# Patient Record
Sex: MALE | Race: WHITE | RURAL
[De-identification: names, ages, dates, MRNs, and addresses within clinical notes are randomized per-mention and may not be internally consistent; named-entity substitution may affect disease eponyms.]

---

## 2019-06-07 ENCOUNTER — OFFICE VISIT (OUTPATIENT)
Dept: FAMILY MEDICINE CLINIC | Age: 24
End: 2019-06-07

## 2019-06-07 VITALS
WEIGHT: 165.4 LBS | TEMPERATURE: 99.8 F | OXYGEN SATURATION: 99 % | RESPIRATION RATE: 18 BRPM | DIASTOLIC BLOOD PRESSURE: 78 MMHG | HEIGHT: 72 IN | SYSTOLIC BLOOD PRESSURE: 123 MMHG | HEART RATE: 100 BPM | BODY MASS INDEX: 22.4 KG/M2

## 2019-06-07 DIAGNOSIS — J02.9 SORE THROAT: ICD-10-CM

## 2019-06-07 DIAGNOSIS — J02.0 STREP THROAT: Primary | ICD-10-CM

## 2019-06-07 LAB
S PYO AG THROAT QL: POSITIVE
VALID INTERNAL CONTROL?: YES

## 2019-06-07 RX ORDER — AMOXICILLIN 500 MG/1
500 CAPSULE ORAL 2 TIMES DAILY
Qty: 20 CAP | Refills: 0 | Status: SHIPPED | OUTPATIENT
Start: 2019-06-07 | End: 2019-06-17

## 2019-06-07 RX ORDER — CLINDAMYCIN AND BENZOYL PEROXIDE 10; 50 MG/G; MG/G
GEL TOPICAL
Refills: 1 | COMMUNITY
Start: 2019-04-22

## 2019-06-07 NOTE — LETTER
NOTIFICATION RETURN TO WORK / SCHOOL 
 
6/7/2019 8:51 AM 
 
Mr. Amanda Arambula [address] [place] [id number] To Whom It May Concern: 
 
Amanda Arambula is currently under the care of [facility]. He needs to be excused from work on 6/7, due to illness If there are questions or concerns please have the patient contact our office. Sincerely, Amisha Schmidt NP

## 2019-06-07 NOTE — PROGRESS NOTES
Melida Guerrero is a 26 y.o. male    Chief Complaint   Patient presents with    New Patient    Establish Care    Sore Throat     x 2 days       1. Have you been to the ER, urgent care clinic since your last visit? Hospitalized since your last visit? No  M  2. Have you seen or consulted any other health care providers outside of the [address] [place] [address] since your last visit? Include any pap smears or colon screening. No      Visit Vitals  /78 (BP 1 Location: Left arm, BP Patient Position: Sitting)   Pulse 100   Temp 99.8 °F (37.7 °C) (Oral)   Resp 18   Ht 6' (1.829 m)   Wt 165 lb 6.4 oz (75 kg)   SpO2 99%   BMI 22.43 kg/m²           Health Maintenance Due   Topic Date Due    DTaP/Tdap/Td series (1 - Tdap) 07/25/2017         Medication Reconciliation completed, changes noted.   Please  Update medication list.

## 2019-06-07 NOTE — PATIENT INSTRUCTIONS

## 2019-06-07 NOTE — PROGRESS NOTES
HISTORY OF PRESENT ILLNESS  Vianca Renteria is a 26 y.o. male. HPI  Pt presents with \"sore throat\"    Symptoms have been present for about 3 days  Sore throat  Swollen throat    OTC: Ibuprofen/Tylenol  Review of Systems   Constitutional: Negative for fever. HENT: Positive for sore throat. Gastrointestinal: Negative for diarrhea and vomiting. Physical Exam   Constitutional: He is oriented to person, place, and time. He appears well-developed and well-nourished. HENT:   Head: Normocephalic and atraumatic. Right Ear: Hearing, tympanic membrane, external ear and ear canal normal.   Left Ear: Hearing, tympanic membrane, external ear and ear canal normal.   Nose: Mucosal edema and rhinorrhea present. Mouth/Throat: Posterior oropharyngeal edema and posterior oropharyngeal erythema present. Neck: Normal range of motion. Neck supple. Cardiovascular: Normal rate, regular rhythm and normal heart sounds. Pulmonary/Chest: Effort normal and breath sounds normal.   Lymphadenopathy:     He has cervical adenopathy. Right cervical: Superficial cervical adenopathy present. Left cervical: Superficial cervical adenopathy present. Neurological: He is alert and oriented to person, place, and time. Skin: Skin is warm and dry. Psychiatric: He has a normal mood and affect. His behavior is normal.       ASSESSMENT and PLAN    ICD-10-CM ICD-9-CM    1. Strep throat J02.0 034.0 amoxicillin (AMOXIL) 500 mg capsule   2. Sore throat J02.9 462 AMB POC RAPID STREP A     Educated about taking medication as prescribed, with food  Should monitor fever, and treat as needed    Pt informed to return to office with worsening of symptoms, or PRN with any questions or concerns. Pt verbalizes understanding of plan of care and denies further questions or concerns at this time.

## 2020-04-13 ENCOUNTER — VIRTUAL VISIT (OUTPATIENT)
Dept: FAMILY MEDICINE CLINIC | Age: 25
End: 2020-04-13

## 2020-04-13 VITALS — HEIGHT: 72 IN | BODY MASS INDEX: 22.43 KG/M2

## 2020-04-13 DIAGNOSIS — M79.605 LEFT LEG PAIN: Primary | ICD-10-CM

## 2020-04-13 DIAGNOSIS — G89.29 CHRONIC RIGHT SHOULDER PAIN: ICD-10-CM

## 2020-04-13 DIAGNOSIS — M25.511 CHRONIC RIGHT SHOULDER PAIN: ICD-10-CM

## 2020-04-13 RX ORDER — CYCLOBENZAPRINE HCL 10 MG
10 TABLET ORAL
Qty: 30 TAB | Refills: 0 | Status: SHIPPED | OUTPATIENT
Start: 2020-04-13

## 2020-04-13 RX ORDER — METHYLPREDNISOLONE 4 MG/1
TABLET ORAL
Qty: 1 DOSE PACK | Refills: 0 | Status: SHIPPED | OUTPATIENT
Start: 2020-04-13

## 2020-04-13 NOTE — PROGRESS NOTES
HISTORY OF PRESENT ILLNESS  Jessica Causey is a 22 y.o. male. HPI  Pt presents with \"leg cramps\"  Visit was conducted via [employer], [employer]. me  Pt was located at home, provider was located at [facility]  Visit lasted 6 minutes    Consent: Jessica Causey, who was seen by synchronous (real-time) audio-video technology, and/or his healthcare decision maker, is aware that this patient-initiated, Telehealth encounter on 4/13/2020 is a billable service, with coverage as determined by his insurance carrier. He is aware that he may receive a bill and has provided verbal consent to proceed: Yes. Pt states that at times, his left thigh/upper leg will \"lock up\"  It feels like a muscle spasms when it happens, and he is unable to move his leg fully  When this happens, the muscles in his leg feel very tight  No erythema, no warmth tot he area  This has been going on about 3 weeks  Will occur off and on  He has tried Ibuprofen, with no real relief    In addition, he has chronic pain in his right shoulder. Pt states that he used to play baseball, and is wondering if this is why? At times, when he over uses his right  arm/shoulder, he will have pain in the joint  No swelling or erythema  Review of Systems   Constitutional: Negative for fever. Musculoskeletal: Positive for joint pain. Physical Exam  Constitutional:       Appearance: Normal appearance. Musculoskeletal:      Right shoulder: He exhibits normal range of motion. Legs:    Neurological:      Mental Status: He is alert. ASSESSMENT and PLAN    ICD-10-CM ICD-9-CM    1. Left leg pain M79.605 729.5 methylPREDNISolone (MEDROL DOSEPACK) 4 mg tablet      cyclobenzaprine (FLEXERIL) 10 mg tablet   2.  Chronic right shoulder pain M25.511 719.41 methylPREDNISolone (MEDROL DOSEPACK) 4 mg tablet    G89.29 338.29 cyclobenzaprine (FLEXERIL) 10 mg tablet     Educated about taking medication as prescribed  Should notify office should symptoms not improve, and we will refer to ortho    Pt informed to return to office with worsening of symptoms, or PRN with any questions or concerns. Pt verbalizes understanding of plan of care and denies further questions or concerns at this time.

## 2020-04-13 NOTE — PROGRESS NOTES
Identified pt with two pt identifiers(name and ). Chief Complaint   Patient presents with    Claudication     left leg cramping over the last few weeks and leg will \"lock up to where I cannot move it\"        Health Maintenance Due   Topic    DTaP/Tdap/Td series (1 - Tdap)       Wt Readings from Last 3 Encounters:   19 165 lb 6.4 oz (75 kg)     Temp Readings from Last 3 Encounters:   19 99.8 °F (37.7 °C) (Oral)     BP Readings from Last 3 Encounters:   19 123/78     Pulse Readings from Last 3 Encounters:   19 100         Learning Assessment:  :     Learning Assessment 2019   PRIMARY LEARNER Patient   HIGHEST LEVEL OF EDUCATION - PRIMARY LEARNER  SOME COLLEGE   PRIMARY LANGUAGE ENGLISH   LEARNER PREFERENCE PRIMARY DEMONSTRATION   ANSWERED BY patient    RELATIONSHIP SELF       Depression Screening:  :     3 most recent PHQ Screens 2020   Little interest or pleasure in doing things Not at all   Feeling down, depressed, irritable, or hopeless Not at all   Total Score PHQ 2 0       Fall Risk Assessment:  :     No flowsheet data found. Abuse Screening:  :     Abuse Screening Questionnaire 2020   Do you ever feel afraid of your partner? N   Are you in a relationship with someone who physically or mentally threatens you? N   Is it safe for you to go home? Y       Coordination of Care Questionnaire:  :     1) Have you been to an emergency room, urgent care clinic since your last visit? Yes, [facility] for physical  Hospitalized since your last visit? no             2) Have you seen or consulted any other health care providers outside of [address] [place] [address] since your last visit? no  (Include any pap smears or colon screenings in this section.)    3) Do you have an Advance Directive on file? no  Are you interested in receiving information about Advance Directives? no    Reviewed record in preparation for visit and have obtained necessary documentation.   Medication reconciliation up to date and corrected with patient at this time.

## 2023-05-25 RX ORDER — CLINDAMYCIN AND BENZOYL PEROXIDE 10; 50 MG/G; MG/G
GEL TOPICAL
COMMUNITY
Start: 2019-04-22

## 2023-05-25 RX ORDER — CYCLOBENZAPRINE HCL 10 MG
10 TABLET ORAL
COMMUNITY
Start: 2020-04-13

## 2023-05-25 RX ORDER — METHYLPREDNISOLONE 4 MG/1
TABLET ORAL
COMMUNITY
Start: 2020-04-13